# Patient Record
Sex: FEMALE | Race: BLACK OR AFRICAN AMERICAN | NOT HISPANIC OR LATINO | Employment: FULL TIME | URBAN - METROPOLITAN AREA
[De-identification: names, ages, dates, MRNs, and addresses within clinical notes are randomized per-mention and may not be internally consistent; named-entity substitution may affect disease eponyms.]

---

## 2019-02-11 ENCOUNTER — OFFICE VISIT (OUTPATIENT)
Dept: NEUROLOGY | Facility: HOSPITAL | Age: 64
End: 2019-02-11
Attending: SURGERY
Payer: MEDICARE

## 2019-02-11 VITALS
HEART RATE: 81 BPM | SYSTOLIC BLOOD PRESSURE: 171 MMHG | HEIGHT: 65 IN | BODY MASS INDEX: 41.31 KG/M2 | WEIGHT: 247.94 LBS | TEMPERATURE: 99 F | DIASTOLIC BLOOD PRESSURE: 86 MMHG

## 2019-02-11 DIAGNOSIS — Z09 POSTOP CHECK: Primary | ICD-10-CM

## 2019-02-11 PROCEDURE — 99213 OFFICE O/P EST LOW 20 MIN: CPT | Performed by: SURGERY

## 2019-02-11 RX ORDER — PANTOPRAZOLE SODIUM 40 MG/1
40 TABLET, DELAYED RELEASE ORAL DAILY
COMMUNITY

## 2019-02-11 RX ORDER — METFORMIN HYDROCHLORIDE 500 MG/1
500 TABLET ORAL 2 TIMES DAILY WITH MEALS
COMMUNITY

## 2019-02-11 RX ORDER — ATORVASTATIN CALCIUM 40 MG/1
40 TABLET, FILM COATED ORAL DAILY
COMMUNITY
End: 2019-05-01 | Stop reason: SDUPTHER

## 2019-02-11 RX ORDER — METOPROLOL SUCCINATE 100 MG/1
100 TABLET, EXTENDED RELEASE ORAL NIGHTLY
COMMUNITY

## 2019-02-11 NOTE — PROGRESS NOTES
S/p ex lap for repair of perf Duodenal ulcer    Doing well  Slowly starting to eat better  Mobile at home    abd soft wound looks good  Staples removed    Continue protonix for one month  F/u PCp  ASAP    F/u with me 2 months

## 2019-03-13 ENCOUNTER — TELEPHONE (OUTPATIENT)
Dept: NEUROLOGY | Facility: HOSPITAL | Age: 64
End: 2019-03-13

## 2019-03-13 NOTE — TELEPHONE ENCOUNTER
----- Message from Tori Lucero sent at 3/13/2019  8:37 AM CDT -----  Contact: Patient's Daughter  RONALDO:  Patient is requesting extended leave from work until May 17.  Requesting letter on office letterhead to be sent to Rebekah at [phone] and [phone].  Patient's daughter can be reached at [phone].  Thank you  abc

## 2019-03-13 NOTE — LETTER
March 13, 2019      [facility] [address] [place] [address]  Phone: [phone]  Fax: [phone]       Patient: Kathryn Banuelos   YOB: 1955    To Whom It May Concern:    Jeni Banuelos  was at [facility] on 2/11/2019. She is requiring extended leave and may return to work/school on 5/17/2019 with no restrictions. If you have any questions or concerns, or if I can be of further assistance, please do not hesitate to contact me.    Sincerely,      Katelyn Waddell LPN for   Dr. Angelo Lazcano

## 2019-04-08 ENCOUNTER — OFFICE VISIT (OUTPATIENT)
Dept: NEUROLOGY | Facility: HOSPITAL | Age: 64
End: 2019-04-08
Attending: SURGERY
Payer: MEDICARE

## 2019-04-08 VITALS
BODY MASS INDEX: 39.46 KG/M2 | DIASTOLIC BLOOD PRESSURE: 75 MMHG | HEART RATE: 75 BPM | SYSTOLIC BLOOD PRESSURE: 147 MMHG | WEIGHT: 237.13 LBS | TEMPERATURE: 98 F

## 2019-04-08 DIAGNOSIS — Z98.890 S/P EXPLORATORY LAPAROTOMY: Primary | ICD-10-CM

## 2019-04-08 DIAGNOSIS — K26.9 DUODENAL ULCER: ICD-10-CM

## 2019-04-08 PROCEDURE — 99213 OFFICE O/P EST LOW 20 MIN: CPT | Performed by: SURGERY

## 2019-04-08 RX ORDER — ERGOCALCIFEROL 1.25 MG/1
50000 CAPSULE ORAL
COMMUNITY

## 2019-04-08 NOTE — PROGRESS NOTES
"[facility]:  [facility]  A collaboration between [facility] and [facility]      PATIENT: Kathryn Banuelos  MRN: [id number]  DATE: 4/8/2019    Subjective:      Chief Complaint: Follow-up (2 month)   She is status post ex lap for perforated duodenal ulcer at [facility].  She had a prolonged hospital course and was finally discharged.  She also had a cholecystitis at the time she underwent subtotal cholecystectomy.    doing well  Eating well active  having BM    STILL ON PROTONIX  Vitals:   Vitals:    04/08/19 0858   BP: (!) 147/75   BP Location: Right arm   Patient Position: Sitting   BP Method: Large (Automatic)   Pulse: 75   Temp: 98 °F (36.7 °C)   TempSrc: Oral   Weight: 107.6 kg (237 lb 1.7 oz)        Karnofsky Score:   Karnofsky Score    Karnofsky Score:  80% - Normal Activity with Effort: Some Symptoms of Disease         Diagnosis: No diagnosis found.     Oncologic History:     Interval History:     Past Medical History:  History reviewed. No pertinent past medical history.    Past Surgical History:  Past Surgical History:   Procedure Laterality Date    CHOLECYSTECTOMY  01/11/2019       Family History:  History reviewed. No pertinent family history.    Allergies:  Review of patient's allergies indicates:   Allergen Reactions    Penicillins      "sometimes I get chest pain"       Medications:  Current Outpatient Medications   Medication Sig Dispense Refill    apixaban (ELIQUIS) 5 mg Tab Take 5 mg by mouth 2 (two) times daily.      atorvastatin (LIPITOR) 40 MG tablet Take 40 mg by mouth once daily.      ergocalciferol (ERGOCALCIFEROL) 50,000 unit Cap Take 50,000 Units by mouth every 7 days.      metFORMIN (GLUCOPHAGE) 500 MG tablet Take 500 mg by mouth 2 (two) times daily with meals.      metoprolol succinate (TOPROL-XL) 100 MG 24 hr tablet Take 100 mg by mouth nightly.      pantoprazole (PROTONIX) " 40 MG tablet Take 40 mg by mouth once daily.       No current facility-administered medications for this visit.        Review of Systems   Constitutional: Negative for activity change, appetite change, chills, diaphoresis, fatigue, fever and unexpected weight change.   HENT: Negative for congestion, dental problem, drooling, ear discharge, ear pain, facial swelling, hearing loss, nosebleeds, postnasal drip, rhinorrhea, sinus pressure and sneezing.    Eyes: Negative for photophobia, discharge, itching and visual disturbance.   Respiratory: Negative for apnea, cough, choking, chest tightness, shortness of breath, wheezing and stridor.    Cardiovascular: Negative.  Negative for palpitations and leg swelling.   Gastrointestinal: Negative for abdominal distention, blood in stool, rectal pain and vomiting.   Endocrine: Negative.    Genitourinary: Negative.    Musculoskeletal: Negative.  Negative for arthralgias, gait problem, neck pain and neck stiffness.   Skin: Negative.    Allergic/Immunologic: Negative.    Neurological: Negative for dizziness, tremors, facial asymmetry, speech difficulty, light-headedness, numbness and headaches.   Hematological: Negative.    Psychiatric/Behavioral: Negative for agitation and confusion.      Objective:      Physical Exam   Constitutional: She is oriented to person, place, and time. She appears well-developed and well-nourished.   HENT:   Head: Normocephalic and atraumatic.   Right Ear: External ear normal.   Left Ear: External ear normal.   Eyes: Pupils are equal, round, and reactive to light. Conjunctivae and EOM are normal.   Neck: Normal range of motion.   Cardiovascular: Normal rate and regular rhythm.   Pulmonary/Chest: Effort normal and breath sounds normal.   Abdominal: Soft. Bowel sounds are normal. She exhibits no distension and no mass. There is no tenderness. There is no rebound. No hernia.   Incision looks good  No hernia   Musculoskeletal: Normal range of motion.    Neurological: She is alert and oriented to person, place, and time.   Skin: Skin is warm.   Psychiatric: She has a normal mood and affect. Her behavior is normal.      Assessment:       No diagnosis found.    Laboratory Data:  Not samina recently    Impression: s/p ex lap for perf duodenal ulcer and cholecystitis    Doing well  Still on Protonix a plan is to wean of Protonix.  Will wait for the EGD and then decide on the Protonix therapy.    Overall she is doing good with back normal activities and diet.  I strongly suggested that she watch her diet and the continue physical activities.  I also emphasized the need for close follow-up with her primary care physician    Will need EGD. Has not had colonoscopy  Plan:         F/u 6 months  Referred to Dr. Murphy to do EGD and colonoscopy.  Will decide on the continue shin of Protonix after the EGD  Continue follow-up with primary care physician              JURGEN Stephens MD, FACS   Associate Professor of Surgery, [facility]   Neuroendocrine Surgery, Hepatic/Pancreatic & General Surgery   [address]   [place] [address]   ph. [phone]; [phone]   fax. [phone]

## 2019-04-08 NOTE — PATIENT INSTRUCTIONS
6 month follow up with Dr. Camp on Thursday, October 10, 2019 at 11am.    Appt with Dr. Kai Murphy on Wednesday, 4/24/19 at 945am, edg/colonoscopy consult.

## 2019-05-01 ENCOUNTER — OFFICE VISIT (OUTPATIENT)
Dept: NEUROLOGY | Facility: HOSPITAL | Age: 64
End: 2019-05-01
Attending: INTERNAL MEDICINE
Payer: MEDICARE

## 2019-05-01 VITALS
TEMPERATURE: 99 F | HEART RATE: 74 BPM | WEIGHT: 228.19 LBS | DIASTOLIC BLOOD PRESSURE: 98 MMHG | HEIGHT: 65 IN | SYSTOLIC BLOOD PRESSURE: 210 MMHG | BODY MASS INDEX: 38.02 KG/M2

## 2019-05-01 DIAGNOSIS — K25.9 GASTRIC ULCER, UNSPECIFIED CHRONICITY, UNSPECIFIED WHETHER GASTRIC ULCER HEMORRHAGE OR PERFORATION PRESENT: Primary | ICD-10-CM

## 2019-05-01 DIAGNOSIS — Z12.11 SCREENING FOR MALIGNANT NEOPLASM OF COLON: ICD-10-CM

## 2019-05-01 PROCEDURE — 99214 OFFICE O/P EST MOD 30 MIN: CPT | Performed by: INTERNAL MEDICINE

## 2019-05-01 RX ORDER — LOSARTAN POTASSIUM 25 MG/1
TABLET ORAL
COMMUNITY
Start: 2019-04-26

## 2019-05-01 RX ORDER — ROSUVASTATIN CALCIUM 10 MG/1
TABLET, COATED ORAL
COMMUNITY
Start: 2019-04-29

## 2019-05-01 RX ORDER — POLYETHYLENE GLYCOL 3350, SODIUM SULFATE ANHYDROUS, SODIUM BICARBONATE, SODIUM CHLORIDE, POTASSIUM CHLORIDE 236; 22.74; 6.74; 5.86; 2.97 G/4L; G/4L; G/4L; G/4L; G/4L
4 POWDER, FOR SOLUTION ORAL ONCE
Qty: 4000 ML | Refills: 0 | Status: SHIPPED | OUTPATIENT
Start: 2019-05-01 | End: 2019-05-01

## 2019-05-01 NOTE — PROGRESS NOTES
"[facility]    CC: Ulcer    HPI 63 y.o. female with past medical history of DM, HTN, and HLD presents to clinic for evaluation of perforated duodenal ulcer s/p ex lap repair 1/2019 associated with mild nausea not associated with overt GI blood loss, changes in bowel habits, hematemesis, or dysphagia.     Patient was seen at [facility] initially for acute cholecystis 1/2019 with subsequent findings of perforated duodenal ulcer requiring ex lap for repair (records unavailable to me). Patient was referred by Dr. Camp for EGD post-op evaluation. She reports mild nausea with decreased appetite after surgery that has improved. She reports that she is currently on Eliquis since her hospital stay for DVT prophylaxis and has 13 pills remaining. She was advised to finish the medication until it was complete.     Patient reports 2 soft, formed bowel movements per day not associated with melena, hematochezia or  black/tarry stools. She has no known family history of colon cancer and no prior colonoscopies. She reports intentional weight loss with changes in diet after surgery. She endorses prior occasional use of goody powders "years ago". Patient denies GERD, unintentional weight loss, dysphagia, vomiting, hematemesis or abdominal pain.    Previous records were reviewed. Collateral obtained from patient's daughter present, as summarized above.     Past Medical History:   HTN  HLD  DM    Past Surgical History:   Procedure Laterality Date    CHOLECYSTECTOMY  01/11/2019       Social History: no SHAQ abuse         Family History:   No known family history of colon cancer (does not know paternal side of family)    Review of Systems  General ROS: negative for chills, fever or weight loss  Psychological ROS: negative for hallucination, depression or suicidal ideation  Ophthalmic ROS: negative for blurry vision, photophobia or eye pain  ENT ROS: negative for epistaxis, sore throat or rhinorrhea  Respiratory ROS: no cough, " "shortness of breath, or wheezing  Cardiovascular ROS: no chest pain or dyspnea on exertion  Gastrointestinal ROS: +nausea no abdominal pain, change in bowel habits, or black/ bloody stools  Genito-Urinary ROS: no dysuria, trouble voiding, or hematuria  Musculoskeletal ROS: negative for gait disturbance or muscular weakness  Neurological ROS: no syncope or seizures; no ataxia  Dermatological ROS: negative for pruritis, rash and jaundice    Physical Examination  BP (!) 210/98 (BP Location: Right arm, Patient Position: Sitting, BP Method: Medium (Automatic))   Pulse 74   Temp 99 °F (37.2 °C) (Oral)   Ht 5' 5" (1.651 m)   Wt 103.5 kg (228 lb 2.8 oz)   BMI 37.97 kg/m²   General appearance: alert, cooperative, no distress  HENT: Normocephalic, atraumatic, neck symmetrical, no nasal discharge   Eyes: conjunctivae/corneas clear, PERRL, EOM's intact  Lungs: clear to auscultation bilaterally, no dullness to percussion bilaterally  Heart: regular rate and rhythm without rub; no displacement of the PMI   Abdomen: well-healed, midline abdominal scar with no erythema or tenderness noted. soft, non-tender; bowel sounds normoactive; no organomegaly  Extremities: extremities symmetric; no clubbing, cyanosis, or edema  Integument: Skin color, texture, turgor normal; no rashes; hair distrubution normal  Neurologic: Alert and oriented X 3, normal strength, normal coordination and gait  Psychiatric: no pressured speech; normal affect; no evidence of impaired cognition     Labs:  4/20/2016:  H/H: 14.0/42.1  Plat: 324  Cr: 0.8  Alb: 3.8  Tbili: 0.6  Alk phos: 85  AST/ALT:  15/12    Previous labs were reviewed.   Previous medical records reviewed   Additional history obtained from family       Assessment: Ms. Kathryn Banuelos is a 63 y.o. female with past medical history of DM, HTN, and HLD presents to clinic for evaluation of perforated duodenal ulcer s/p ex lap repair 1/2019 associated with mild nausea not associated with overt GI blood " loss, changes in bowel habits, hematemesis, or dysphagia. Ulcer likely secondary to h. Pylori infection, as patient is not a chronic NSAID user.     Plan:   Scheduled EGD 5/23/2019 in order to exclude neoplasm or gastric outlet obstruction. Biposy for H. Pylori at this time.   If ulcer appears healed, consider discontinuing PPI   Scheduled Colonoscopy 5/23/2019 for index average risk screening colonoscopy.   Prep instructions reviewed with patient   Will follow-up results of EGD and Colon for further management and treatment.   Patient referred by Dr. Conrado Murphy MD   [address]   [place] [address] [phone]

## 2019-05-01 NOTE — LETTER
May 1, 2019      [facility] [address] [place] [address]  Phone: [phone]  Fax: [phone]       Patient: Kathryn Banuelos   YOB: 1955  Date of Visit: 05/01/2019    To Whom It May Concern:    Jeni Banuelos  was at [facility] on 05/01/2019. SHE may return to work on 05/16/2019 with no restrictions. If you have any questions or concerns, or if I can be of further assistance, please do not hesitate to contact me.    Sincerely,    Mireya Gregorio LPN

## 2019-05-01 NOTE — PATIENT INSTRUCTIONS
GOLYTELY/ COLYTE/ NULYTELY Instructions    You are scheduled for a colonoscopy with Dr. Murphy on Thursday, May 23, 2019 at [facility] at  830am arrival.  To ensure that your test is accurate and complete, you MUST follow these instructions listed below.  If you have any questions, please call our office at [phone].  Plan on being at the hospital for your procedure for 3-4 hours.    1.  Follow a CLEAR LIQUID DIET for the entire day before your scheduled colonoscopy.  This means no solid food the entire day starting when you wake.  You may have as much of the clear liquids as you want throughout the day.   CLEAR LIQUID DIET:   - Avoid Red, Orange, Purple, and/or Blue food coloring   - NO DAIRY   - You can have:  Coffee with sugar (no creamer), tea, water, soda, apple or white grape juice, chicken or beef broth/bouillon (no meat, noodles, or veggies), green/yellow popsicles, green/yellow Jell-O, lemonade.    2.  MIX GOLYTELY/COLYTE/NULYTELY (all names for same product) WITH ONE (1) GALLON OF WATER.  YOU MAY ADD A FLAVOR PACKET OR YELLOW/GREEN POWDER DRINK MIX TO THIS.  PUT IN REFRIGERATOR.  This is easier to drink if this solution is cold, so you can mix the solution one day ahead of time and place in the refrigerator prior to drinking.  You have to drink the solution within 24-36 hours of mixing it.  Do NOT put this solution over ice.  It IS ok to drink with a straw.    3. AT 5 PM THE DAY BEFORE YOUR COLONOSCOPY, DRINK ONE (1) 8 OUNCE GLASS OF MIXTURE EVERY 10 MINUTES UNTIL HALF OF THE GALLON IS CONSUMED.  Keep this mixture cold and in refrigerator as much as you can while drinking it.  Place the remaining half of mixture in the refrigerator when you finish the first half.    4.  The endoscopy department will call you 2 days before your colonoscopy to tell you the exact time to arrive, AND to tell you the exact time to drink the 2nd portion of your prep (which will be FIVE HOURS BEFORE YOUR ARRIVAL  TIME).  At this time given to you, DRINK ONE (1) 8 OUNCE GLASS OF MIXTURE EVERY 10 MINUTES UNTIL THE OTHER HALF IS CONSUMED. Keep the mixture cold while you are drinking it. Once this is complete, you may not have ANYTHING else by mouth!      5.  You must have someone with you to DRIVE YOU HOME since you will be receiving IV sedation for the colonoscopy.    6.  It is ok to take your heart, blood pressure, and seizure medications in the morning of your test with a SIP of water.  Hold other medications until after your procedure.  Do NOT have anything else to eat or drink the morning of your colonoscopy.  It is ok to brush your teeth.    7.  If you are on blood thinners THAT YOU HAVE BEEN INSTRUCTED TO HOLD BY YOUR DOCTOR FOR THIS PROCEDURE, then do NOT take this the morning of your colonoscopy.  Do NOT stop these medications on your own, they must be approved to be held by your doctor.  Your colonoscopy can NOT be done if you are on these medications.  Examples of blood thinners include: Coumadin, Aggrenox, Plavix, Pradaxa, Reapro, Pletal, Xarelto, Ticagrelor, Brilinta, Eliquis, and high dose aspirin (325 mg).  You do not have to stop baby aspirin 81 mg.    8.  IF YOU ARE DIABETIC:  NO INSULIN OR ORAL MEDICATIONS THE MORNING OF THE COLONOSCOPY.  TAKE ONLY HALF THE DOSE OF YOUR INSULIN THE DAY BEFORE THE COLONOSCOPY.  DO NOT TAKE ANY ORAL DIABETIC MEDICATIONS THE DAY BEFORE THE COLONOSCOPY.  IF YOU ARE AN INSULIN DEPENDENT DIABETIC WITH UNSTABLE BLOOD SUGARDS, NOTIFY YOUR PRIMARY CARE PHYSICIAN FOR INSTRUCTIONS.

## 2019-05-21 ENCOUNTER — TELEPHONE (OUTPATIENT)
Dept: ENDOSCOPY | Facility: HOSPITAL | Age: 64
End: 2019-05-21

## 2019-05-21 NOTE — TELEPHONE ENCOUNTER
Left a message to call back at    bet 8am to 4pm  Arrival time at 0800am.  EGD/Colon.   Portal pending

## 2019-05-22 ENCOUNTER — TELEPHONE (OUTPATIENT)
Dept: ENDOSCOPY | Facility: HOSPITAL | Age: 64
End: 2019-05-22

## 2019-05-22 NOTE — TELEPHONE ENCOUNTER
Spoke with patient about arrival time @ 0800.     Prep instructions reviewed: the day before the procedure, follow a clear liquid diet all day, then start the first 1/2 of prep at 5pm and take 2nd 1/2 of prep @0300.  Pt must be completely NPO when prep completed @ 0500.              Medications: Do not take Insulin or oral diabetic medications the day of the procedure.  Take as prescribed: heart, seizure and blood pressure medication in the morning with a sip of water (less than an ounce).  Take any breathing medications and bring inhalers to hospital with you Leave all valuables and jewelry at home.     Wear comfortable clothes to procedure to change into hospital gown You cannot drive for 24 hours after your procedure because you will receive sedation for your procedure to make you comfortable.  A ride must be provided at discharge.

## 2019-05-23 ENCOUNTER — ANESTHESIA EVENT (OUTPATIENT)
Dept: ENDOSCOPY | Facility: HOSPITAL | Age: 64
End: 2019-05-23
Payer: MEDICARE

## 2019-05-23 ENCOUNTER — ANESTHESIA (OUTPATIENT)
Dept: ENDOSCOPY | Facility: HOSPITAL | Age: 64
End: 2019-05-23
Payer: MEDICARE

## 2019-05-23 NOTE — ANESTHESIA PREPROCEDURE EVALUATION
05/23/2019  Kathryn Banuelos is a 63 y.o., female here for EGD/scope    No past medical history on file.    Past Surgical History:   Procedure Laterality Date    CHOLECYSTECTOMY  01/11/2019       Anesthesia Evaluation    I have reviewed the Patient Summary Reports.    I have reviewed the Nursing Notes.   I have reviewed the Medications.     Review of Systems  Anesthesia Hx:  History of prior surgery of interest to airway management or planning:  Denies Personal Hx of Anesthesia complications.   Cardiovascular:   Exercise tolerance: good Hypertension hyperlipidemia    Pulmonary:  Pulmonary Normal    Hepatic/GI:   GERD    Neurological:  Neurology Normal    Endocrine:   Diabetes        Physical Exam  General:  Well nourished     Eyes/Ears/Nose:  EYES/EARS/NOSE FINDINGS: Normal    Chest/Lungs:  Chest/Lungs Clear    Heart/Vascular:  Heart Findings: Normal       Mental Status:  Mental Status Findings: Normal        Anesthesia Plan  Type of Anesthesia, risks & benefits discussed:  Anesthesia Type:  general, MAC  Patient's Preference:   Intra-op Monitoring Plan:   Intra-op Monitoring Plan Comments:   Post Op Pain Control Plan:   Post Op Pain Control Plan Comments:   Induction:    Beta Blocker:         Informed Consent: Patient understands risks and agrees with Anesthesia plan.  Questions answered. Anesthesia consent signed with patient.  ASA Score: 2     Day of Surgery Review of History & Physical:            Ready For Surgery From Anesthesia Perspective.